# Patient Record
Sex: FEMALE | URBAN - METROPOLITAN AREA
[De-identification: names, ages, dates, MRNs, and addresses within clinical notes are randomized per-mention and may not be internally consistent; named-entity substitution may affect disease eponyms.]

---

## 2022-08-18 ENCOUNTER — OFFICE VISIT (OUTPATIENT)
Dept: URBAN - METROPOLITAN AREA CLINIC 76 | Facility: CLINIC | Age: 42
End: 2022-08-18
Payer: COMMERCIAL

## 2022-08-18 DIAGNOSIS — H43.813 VITREOUS DEGENERATION, BILATERAL: ICD-10-CM

## 2022-08-18 DIAGNOSIS — H52.223 REGULAR ASTIGMATISM, BILATERAL: Primary | ICD-10-CM

## 2022-08-18 DIAGNOSIS — M35.00 SJOGREN SYNDROME: ICD-10-CM

## 2022-08-18 PROCEDURE — 92004 COMPRE OPH EXAM NEW PT 1/>: CPT | Performed by: OPTOMETRIST

## 2022-08-18 ASSESSMENT — KERATOMETRY
OS: 44.13
OD: 44.13

## 2022-08-18 ASSESSMENT — INTRAOCULAR PRESSURE
OS: 19
OD: 18

## 2022-08-18 ASSESSMENT — VISUAL ACUITY
OD: 20/20
OS: 20/20

## 2022-08-18 NOTE — IMPRESSION/PLAN
Impression: Sjogren syndrome: M35.00. Reports in the [facility] being a test pt receiving Anthrax injections 20+ yrs ago. Plan: Discussed diagnosis in detail with patient. Warm compresses with lid massage from top / down, bottom / up, and sweep from inside / out x 2. Patient instructed to use emulsive base lubricant 3-4 x a day. Increase omega foods/nuts and/or Borage/Fish/Krill oil supplement 1500-2500mg capsule orally per day.

## 2023-09-08 ENCOUNTER — OFFICE VISIT (OUTPATIENT)
Dept: URBAN - METROPOLITAN AREA CLINIC 76 | Facility: CLINIC | Age: 43
End: 2023-09-08
Payer: COMMERCIAL

## 2023-09-08 DIAGNOSIS — H43.813 VITREOUS DEGENERATION, BILATERAL: ICD-10-CM

## 2023-09-08 DIAGNOSIS — H52.223 REGULAR ASTIGMATISM, BILATERAL: Primary | ICD-10-CM

## 2023-09-08 DIAGNOSIS — M35.00 SJOGREN SYNDROME: ICD-10-CM

## 2023-09-08 PROCEDURE — 92014 COMPRE OPH EXAM EST PT 1/>: CPT | Performed by: OPTOMETRIST

## 2023-09-08 ASSESSMENT — VISUAL ACUITY
OS: 20/20
OD: 20/20

## 2023-09-08 ASSESSMENT — INTRAOCULAR PRESSURE
OS: 18
OD: 18

## 2023-09-08 ASSESSMENT — KERATOMETRY
OD: 44.63
OS: 44.25

## 2024-10-28 ENCOUNTER — OFFICE VISIT (OUTPATIENT)
Dept: URBAN - METROPOLITAN AREA CLINIC 76 | Facility: CLINIC | Age: 44
End: 2024-10-28
Payer: COMMERCIAL

## 2024-10-28 DIAGNOSIS — H43.811 VITREOUS DEGENERATION, RIGHT EYE: ICD-10-CM

## 2024-10-28 DIAGNOSIS — M35.00 SJOGREN SYNDROME: ICD-10-CM

## 2024-10-28 DIAGNOSIS — H52.223 REGULAR ASTIGMATISM, BILATERAL: Primary | ICD-10-CM

## 2024-10-28 PROCEDURE — 92014 COMPRE OPH EXAM EST PT 1/>: CPT | Performed by: OPTOMETRIST

## 2024-10-28 ASSESSMENT — KERATOMETRY
OS: 44.25
OD: 44.38

## 2024-10-28 ASSESSMENT — VISUAL ACUITY
OS: 20/20
OD: 20/20

## 2024-10-28 ASSESSMENT — INTRAOCULAR PRESSURE
OD: 18
OS: 16